# Patient Record
Sex: MALE | Race: WHITE | NOT HISPANIC OR LATINO | Employment: OTHER | URBAN - METROPOLITAN AREA
[De-identification: names, ages, dates, MRNs, and addresses within clinical notes are randomized per-mention and may not be internally consistent; named-entity substitution may affect disease eponyms.]

---

## 2017-05-19 PROBLEM — F29 PSYCHOSIS: Status: ACTIVE | Noted: 2017-05-19

## 2017-06-02 ENCOUNTER — PATIENT OUTREACH (OUTPATIENT)
Dept: ADMINISTRATIVE | Facility: CLINIC | Age: 52
End: 2017-06-02

## 2017-06-02 NOTE — PROGRESS NOTES
C3 nurse attempted to contact patient. No answer. The following message was left for the patient to return the call:  Good afternoon I am a nurse calling on behalf of [facility] from the [facility].  This is a Transitional Care Call for Allen Ortega. When you have a moment please contact us at [phone] or [phone] Monday through Friday, between the hours of 8 am to 4 pm. We look forward to speaking with you. On behalf of [facility] have a nice day.    The patient does not have an [facility] PCP, C3 nurse with continued efforts to contact patient.

## 2017-06-05 ENCOUNTER — NURSE TRIAGE (OUTPATIENT)
Dept: ADMINISTRATIVE | Facility: CLINIC | Age: 52
End: 2017-06-05

## 2017-06-05 NOTE — PATIENT INSTRUCTIONS
Psychosis  Psychosis is a symptom of certain mental health problems. It involves perceiving reality differently from those around you. The difference between reality and what you think become blurred in your mind. Other mental health conditions, physical diseases, traumatic experiences, or drugs and toxins may bring on psychotic symptoms or behavior.  There are different kinds of psychosis:  · Drug-induced (due to alcohol, methamphetamine, cocaine, LSD, PCP and others)  · Bipolar disorder  · Depression  · Schizophrenia  · Dementia  Symptoms  The symptoms of psychosis may not all be the same for each person. However, they usually involve:  · Hallucinations. Seeing, hearing, feeling, or even tasting or smelling things that are not there  · Delusions. Believing something that is not true, or false beliefs that are not part of a person's Muslim or cultural background.  There may also be disturbances in thinking, speech and behavior, which can include:  · Hearing voices that others do not hear  · Seeing things that others do not see  · Racing thoughts  · Lack of energy  · Feeling very fearful  · Disorganized speech  · Intentional or unintentional bodily harm to others  · Paranoia  · Trouble thinking or concentrating clearly  · Depression, feeling suicidal  · Insomnia  · Withdrawal from those around you  Treatment for psychosis depends on the cause. Medicine, with or without psychotherapy, is often used.  Home care  · Find a healthcare provider and therapist who meet your needs.  Seek help when you feel like your symptoms are returning  · Be certain to tell each of your healthcare providers about all of the prescription drugs, over-the-counter medicines, and supplements you take. Certain supplements interact with medicines and can result in dangerous side effects. Ask your pharmacist when you have questions about drug interactions.  · Be sure to take your medicine as directed even if you think you don't need  it.  · Follow-up with lab tests as advised by your healthcare provider.  · Talk with your family about your feelings and thoughts. Ask them to help you recognize any behavior changes so you can get help and, if needed, medicines can be adjusted.  Follow-up care  Follow up with your counselor, therapist or psychiatrist as advised.  Call 911  Call 911 if you:  · Have suicidal thoughts, a suicide plan, and the means to carry out the plan  · Have troubled breathing  · Are very confused  · Are very drowsy or have trouble awakening  · Faint or lose consciousness  · Have a rapid heart rate, very low heart rate, or a new irregular heart rate  · Have a seizure  When to seek medical advice  Call your healthcare provider right away if any of these occur:  · Gradual or rapid return of psychotic symptoms  · Feeling like you want to harm yourself or another  · Feeling extremely depressed  · Feeling very anxious, agitated, or angry  · Feeling out of control or being controlled by others  · Unable to care for yourself  · Seeing things or hearing voices that you know aren't real  Date Last Reviewed: 9/29/2015  © 8896-2706 [employer]. [address] [place] [address]. All rights reserved. This information is not intended as a substitute for professional medical care. Always follow your healthcare professional's instructions.

## 2017-06-05 NOTE — TELEPHONE ENCOUNTER
Mom called re [facility]. Transferred call to pt outreach.      Reason for Disposition   [1] Follow-up call to recent contact AND [2] information only call, no triage required    Protocols used: ST INFORMATION ONLY CALL-A-AH

## 2019-08-07 PROBLEM — R07.9 CHEST PAIN: Status: ACTIVE | Noted: 2019-08-07

## 2019-08-07 PROBLEM — R07.2 PRECORDIAL PAIN: Status: ACTIVE | Noted: 2019-08-07
